# Patient Record
Sex: MALE | Race: WHITE | NOT HISPANIC OR LATINO | URBAN - METROPOLITAN AREA
[De-identification: names, ages, dates, MRNs, and addresses within clinical notes are randomized per-mention and may not be internally consistent; named-entity substitution may affect disease eponyms.]

---

## 2017-01-09 ENCOUNTER — IMPORTED ENCOUNTER (OUTPATIENT)
Dept: URBAN - METROPOLITAN AREA CLINIC 50 | Facility: CLINIC | Age: 72
End: 2017-01-09

## 2017-12-21 ENCOUNTER — IMPORTED ENCOUNTER (OUTPATIENT)
Dept: URBAN - METROPOLITAN AREA CLINIC 50 | Facility: CLINIC | Age: 72
End: 2017-12-21

## 2018-01-02 ENCOUNTER — IMPORTED ENCOUNTER (OUTPATIENT)
Dept: URBAN - METROPOLITAN AREA CLINIC 50 | Facility: CLINIC | Age: 73
End: 2018-01-02

## 2018-01-15 ENCOUNTER — IMPORTED ENCOUNTER (OUTPATIENT)
Dept: URBAN - METROPOLITAN AREA CLINIC 50 | Facility: CLINIC | Age: 73
End: 2018-01-15

## 2018-01-26 ENCOUNTER — IMPORTED ENCOUNTER (OUTPATIENT)
Dept: URBAN - METROPOLITAN AREA CLINIC 50 | Facility: CLINIC | Age: 73
End: 2018-01-26

## 2018-02-27 ENCOUNTER — IMPORTED ENCOUNTER (OUTPATIENT)
Dept: URBAN - METROPOLITAN AREA CLINIC 50 | Facility: CLINIC | Age: 73
End: 2018-02-27

## 2018-03-06 ENCOUNTER — IMPORTED ENCOUNTER (OUTPATIENT)
Dept: URBAN - METROPOLITAN AREA CLINIC 50 | Facility: CLINIC | Age: 73
End: 2018-03-06

## 2018-03-13 ENCOUNTER — IMPORTED ENCOUNTER (OUTPATIENT)
Dept: URBAN - METROPOLITAN AREA CLINIC 50 | Facility: CLINIC | Age: 73
End: 2018-03-13

## 2018-03-13 NOTE — PATIENT DISCUSSION
"""Phaco with IOL OS: 03/26/2018 Symfony ZXR00 +23.0 Target: [facility]

## 2018-03-26 ENCOUNTER — IMPORTED ENCOUNTER (OUTPATIENT)
Dept: URBAN - METROPOLITAN AREA CLINIC 50 | Facility: CLINIC | Age: 73
End: 2018-03-26

## 2018-03-30 ENCOUNTER — IMPORTED ENCOUNTER (OUTPATIENT)
Dept: URBAN - METROPOLITAN AREA CLINIC 50 | Facility: CLINIC | Age: 73
End: 2018-03-30

## 2018-04-02 ENCOUNTER — IMPORTED ENCOUNTER (OUTPATIENT)
Dept: URBAN - METROPOLITAN AREA CLINIC 50 | Facility: CLINIC | Age: 73
End: 2018-04-02

## 2018-04-02 NOTE — PATIENT DISCUSSION
"""S/P IOL OS: Symfony ZXR00 +23.0 (Target: [place]) +Femto/Arcs +TM. Continue post operative instructions and drops per schedule. "" ""Continue Pred-Gati-Brom left eye once a day. x 1 wk then QD x 3 weeks. """

## 2018-04-26 ENCOUNTER — IMPORTED ENCOUNTER (OUTPATIENT)
Dept: URBAN - METROPOLITAN AREA CLINIC 50 | Facility: CLINIC | Age: 73
End: 2018-04-26

## 2018-08-17 ENCOUNTER — IMPORTED ENCOUNTER (OUTPATIENT)
Dept: URBAN - METROPOLITAN AREA CLINIC 50 | Facility: CLINIC | Age: 73
End: 2018-08-17

## 2018-08-24 ENCOUNTER — IMPORTED ENCOUNTER (OUTPATIENT)
Dept: URBAN - METROPOLITAN AREA CLINIC 50 | Facility: CLINIC | Age: 73
End: 2018-08-24

## 2019-02-08 ENCOUNTER — IMPORTED ENCOUNTER (OUTPATIENT)
Dept: URBAN - METROPOLITAN AREA CLINIC 50 | Facility: CLINIC | Age: 74
End: 2019-02-08

## 2019-02-11 ENCOUNTER — IMPORTED ENCOUNTER (OUTPATIENT)
Dept: URBAN - METROPOLITAN AREA CLINIC 50 | Facility: CLINIC | Age: 74
End: 2019-02-11

## 2019-05-02 ENCOUNTER — IMPORTED ENCOUNTER (OUTPATIENT)
Dept: URBAN - METROPOLITAN AREA CLINIC 50 | Facility: CLINIC | Age: 74
End: 2019-05-02

## 2020-02-14 ENCOUNTER — IMPORTED ENCOUNTER (OUTPATIENT)
Dept: URBAN - METROPOLITAN AREA CLINIC 50 | Facility: CLINIC | Age: 75
End: 2020-02-14

## 2020-02-14 NOTE — PATIENT DISCUSSION
"""Improving.  Will continue to monitor."" ""Continue Artificial tears both eyes two - four times a day ."" ""Continue Warm compresses left eye BID-TID

## 2021-03-11 ENCOUNTER — IMPORTED ENCOUNTER (OUTPATIENT)
Dept: URBAN - METROPOLITAN AREA CLINIC 50 | Facility: CLINIC | Age: 76
End: 2021-03-11

## 2021-04-17 ASSESSMENT — PACHYMETRY
OD_CT_UM: 523
OS_CT_UM: 535
OS_CT_UM: 535
OD_CT_UM: 523
OS_CT_UM: 535
OD_CT_UM: 523
OS_CT_UM: 535
OS_CT_UM: 535

## 2021-04-17 ASSESSMENT — VISUAL ACUITY
OS_BAT: 20/25
OD_OTHER: 20/50. 20/80.
OD_PH: @ 16 IN
OS_SC: 20/30
OD_CC: J1+@ 13 IN
OD_BAT: 20/50
OD_CC: J1+
OS_CC: J1+@ 16 IN
OD_SC: 20/25-3
OD_SC: 20/30
OS_CC: J1+
OS_CC: 20/30
OD_BAT: 20/50
OS_SC: 20/25 +/-
OD_CC: J1NEAR
OD_OTHER: 20/30. 20/40.
OS_SC: 20/25
OS_OTHER: 20/25. 20/40.
OD_SC: 20/25
OD_OTHER: 20/50. 20/80.
OD_PH: 20/30
OS_PH: 20/20
OD_BAT: 20/30
OD_CC: J1@ 16 IN
OS_BAT: 20/25
OS_SC: 20/25-
OS_CC: J1@ 16 IN
OS_SC: 20/30+2
OD_SC: 20/20-2
OS_CC: J1NEAR
OD_CC: J1+@ 16 IN
OD_CC: J1+NEAR
OS_CC: 20/80
OD_SC: 20/25 +/-
OS_SC: 20/25+1
OD_PH: 20/30
OS_SC: 20/25
OD_CC: 20/40+
OS_OTHER: 20/25. 20/25.
OS_BAT: 20/25
OD_CC: J1@ 16 IN
OS_OTHER: 20/25. 20/40.
OD_SC: 20/50-2
OD_SC: 20/20-
OD_CC: 20/40
OS_OTHER: 20/80. 20/100.
OS_CC: J1+-
OS_BAT: 20/80
OS_CC: 20/25
OD_PH: 20/25
OD_CC: J1+-
OS_CC: J1+NEAR
OS_SC: 20/20-2
OD_SC: 20/25+2
OS_CC: J1@ 16 IN
OS_PH: @ 16 IN
OS_SC: 20/50
OD_SC: 20/20
OS_CC: J1+@ 13 IN
OS_PH: 20/25
OD_SC: 20/25-

## 2021-04-17 ASSESSMENT — TONOMETRY
OD_IOP_MMHG: 13
OS_IOP_MMHG: 17
OD_IOP_MMHG: 13
OD_IOP_MMHG: 14
OS_IOP_MMHG: 15
OD_IOP_MMHG: 15
OD_IOP_MMHG: 15
OD_IOP_MMHG: 13
OS_IOP_MMHG: 15
OD_IOP_MMHG: 14
OD_IOP_MMHG: 12
OD_IOP_MMHG: 14
OS_IOP_MMHG: 15
OD_IOP_MMHG: 16
OD_IOP_MMHG: 16
OD_IOP_MMHG: 15
OS_IOP_MMHG: 12
OS_IOP_MMHG: 16
OS_IOP_MMHG: 16
OD_IOP_MMHG: 15
OS_IOP_MMHG: 14
OS_IOP_MMHG: 20
OD_IOP_MMHG: 14
OS_IOP_MMHG: 12
OD_IOP_MMHG: 14
OS_IOP_MMHG: 13
OS_IOP_MMHG: 15
OS_IOP_MMHG: 15
OD_IOP_MMHG: 19
OS_IOP_MMHG: 13
OS_IOP_MMHG: 14
OS_IOP_MMHG: 14

## 2022-03-07 ENCOUNTER — PREPPED CHART (OUTPATIENT)
Dept: URBAN - METROPOLITAN AREA CLINIC 53 | Facility: CLINIC | Age: 77
End: 2022-03-07

## 2022-03-18 ENCOUNTER — COMPREHENSIVE EXAM (OUTPATIENT)
Dept: URBAN - METROPOLITAN AREA CLINIC 53 | Facility: CLINIC | Age: 77
End: 2022-03-18

## 2022-03-18 DIAGNOSIS — H16.223: ICD-10-CM

## 2022-03-18 DIAGNOSIS — H35.373: ICD-10-CM

## 2022-03-18 DIAGNOSIS — H43.813: ICD-10-CM

## 2022-03-18 PROCEDURE — 92014 COMPRE OPH EXAM EST PT 1/>: CPT

## 2022-03-18 PROCEDURE — 92134 CPTRZ OPH DX IMG PST SGM RTA: CPT

## 2022-03-18 ASSESSMENT — VISUAL ACUITY
OS_SC: J1
OU_SC: 20/20
OU_SC: J1+
OS_SC: 20/20-2
OD_SC: J1
OD_SC: 20/20-2

## 2022-03-18 ASSESSMENT — TONOMETRY
OD_IOP_MMHG: 13
OS_IOP_MMHG: 12

## 2024-03-29 ENCOUNTER — COMPREHENSIVE EXAM (OUTPATIENT)
Dept: URBAN - METROPOLITAN AREA CLINIC 53 | Facility: CLINIC | Age: 79
End: 2024-03-29

## 2024-03-29 DIAGNOSIS — H43.813: ICD-10-CM

## 2024-03-29 DIAGNOSIS — H35.373: ICD-10-CM

## 2024-03-29 DIAGNOSIS — H16.223: ICD-10-CM

## 2024-03-29 PROCEDURE — 99214 OFFICE O/P EST MOD 30 MIN: CPT

## 2024-03-29 PROCEDURE — 92134 CPTRZ OPH DX IMG PST SGM RTA: CPT

## 2024-03-29 PROCEDURE — 92015 DETERMINE REFRACTIVE STATE: CPT

## 2024-03-29 ASSESSMENT — VISUAL ACUITY
OU_SC: J1+@14"
OS_SC: J2@14"
OS_SC: 20/25-1
OD_SC: J2@14"
OU_SC: 20/20
OD_SC: 20/20-1

## 2024-03-29 ASSESSMENT — KERATOMETRY
OS_AXISANGLE2_DEGREES: 95
OS_K2POWER_DIOPTERS: 44.50
OS_K1POWER_DIOPTERS: 44.00
OD_AXISANGLE2_DEGREES: 42
OD_AXISANGLE_DEGREES: 132
OD_K1POWER_DIOPTERS: 44.00
OS_AXISANGLE_DEGREES: 005
OD_K2POWER_DIOPTERS: 44.25

## 2024-03-29 ASSESSMENT — TONOMETRY
OS_IOP_MMHG: 13
OD_IOP_MMHG: 13

## 2024-05-31 ENCOUNTER — EMERGENCY VISIT (OUTPATIENT)
Dept: URBAN - METROPOLITAN AREA CLINIC 53 | Facility: CLINIC | Age: 79
End: 2024-05-31

## 2024-05-31 DIAGNOSIS — H33.302: ICD-10-CM

## 2024-05-31 DIAGNOSIS — H35.713: ICD-10-CM

## 2024-05-31 PROCEDURE — 99214 OFFICE O/P EST MOD 30 MIN: CPT

## 2024-05-31 PROCEDURE — 92134 CPTRZ OPH DX IMG PST SGM RTA: CPT

## 2024-05-31 ASSESSMENT — TONOMETRY
OD_IOP_MMHG: 12
OS_IOP_MMHG: 13

## 2024-05-31 ASSESSMENT — VISUAL ACUITY
OD_SC: 20/20-1
OU_SC: 20/20
OS_SC: 20/25

## 2024-11-20 NOTE — PATIENT DISCUSSION
"""Continue Artificial tears both eyes two - four times a day.  """
"""Informed patient that their capsular opacification is not visually significant or does not meet the minimum criteria for capsulotomy.  Recommended attention to PCO symptoms
"""Recommended warm compresses to the left eye
"""Recommended warm compresses to the right eye
"""S/P IOL OU: OD: Symfony ZXR00 22. 50Femto/Arcs. OS: Symfony ZXR00 +23.0 (Target: [place])/Arcs +TM. "
6